# Patient Record
Sex: MALE | Race: BLACK OR AFRICAN AMERICAN | Employment: PART TIME | URBAN - METROPOLITAN AREA
[De-identification: names, ages, dates, MRNs, and addresses within clinical notes are randomized per-mention and may not be internally consistent; named-entity substitution may affect disease eponyms.]

---

## 2017-01-07 PROBLEM — J93.83 SPONTANEOUS PNEUMOTHORAX: Status: ACTIVE | Noted: 2017-01-07

## 2017-01-18 ENCOUNTER — OFFICE VISIT (OUTPATIENT)
Dept: CARDIOTHORACIC SURGERY | Age: 27
End: 2017-01-18

## 2017-01-18 VITALS
TEMPERATURE: 97.8 F | BODY MASS INDEX: 22.58 KG/M2 | HEART RATE: 66 BPM | WEIGHT: 195.2 LBS | HEIGHT: 78 IN | OXYGEN SATURATION: 98 % | SYSTOLIC BLOOD PRESSURE: 120 MMHG | DIASTOLIC BLOOD PRESSURE: 72 MMHG

## 2017-01-18 DIAGNOSIS — Z09 FOLLOW-UP EXAMINATION FOLLOWING SURGERY: Primary | ICD-10-CM

## 2017-01-18 PROCEDURE — 99024 POSTOP FOLLOW-UP VISIT: CPT | Performed by: THORACIC SURGERY (CARDIOTHORACIC VASCULAR SURGERY)

## 2020-08-11 ENCOUNTER — HOSPITAL ENCOUNTER (EMERGENCY)
Age: 30
Discharge: HOME | End: 2020-08-11
Attending: EMERGENCY MEDICINE
Payer: MEDICAID

## 2020-08-11 VITALS
HEIGHT: 78 IN | DIASTOLIC BLOOD PRESSURE: 76 MMHG | SYSTOLIC BLOOD PRESSURE: 135 MMHG | BODY MASS INDEX: 27.31 KG/M2 | RESPIRATION RATE: 16 BRPM | OXYGEN SATURATION: 99 % | TEMPERATURE: 98.3 F | WEIGHT: 236 LBS | HEART RATE: 56 BPM

## 2020-08-11 PROCEDURE — 99283 EMERGENCY DEPT VISIT LOW MDM: CPT

## 2020-08-11 PROCEDURE — 96372 THER/PROPH/DIAG INJ SC/IM: CPT

## 2020-08-11 PROCEDURE — 6360000002 HC RX W HCPCS: Performed by: STUDENT IN AN ORGANIZED HEALTH CARE EDUCATION/TRAINING PROGRAM

## 2020-08-11 RX ORDER — METHYLPREDNISOLONE 4 MG/1
TABLET ORAL
Qty: 1 KIT | Refills: 0 | Status: SHIPPED | OUTPATIENT
Start: 2020-08-11 | End: 2020-08-17

## 2020-08-11 RX ORDER — KETOROLAC TROMETHAMINE 30 MG/ML
30 INJECTION, SOLUTION INTRAMUSCULAR; INTRAVENOUS ONCE
Status: COMPLETED | OUTPATIENT
Start: 2020-08-11 | End: 2020-08-11

## 2020-08-11 RX ORDER — GABAPENTIN 100 MG/1
100 CAPSULE ORAL 3 TIMES DAILY
Qty: 21 CAPSULE | Refills: 0 | Status: SHIPPED | OUTPATIENT
Start: 2020-08-11 | End: 2020-08-21

## 2020-08-11 RX ADMIN — KETOROLAC TROMETHAMINE 30 MG: 30 INJECTION, SOLUTION INTRAMUSCULAR at 11:24

## 2020-08-11 ASSESSMENT — PAIN DESCRIPTION - PAIN TYPE: TYPE: ACUTE PAIN

## 2020-08-11 ASSESSMENT — ENCOUNTER SYMPTOMS
EYE PAIN: 0
BACK PAIN: 1
NAUSEA: 0
SORE THROAT: 0
WHEEZING: 0
SHORTNESS OF BREATH: 0
ABDOMINAL PAIN: 0
VOMITING: 0

## 2020-08-11 ASSESSMENT — PAIN DESCRIPTION - LOCATION: LOCATION: BACK;LEG

## 2020-08-11 ASSESSMENT — PAIN SCALES - GENERAL: PAINLEVEL_OUTOF10: 8

## 2020-08-11 ASSESSMENT — PAIN DESCRIPTION - ORIENTATION: ORIENTATION: LOWER;LEFT

## 2020-08-11 ASSESSMENT — PAIN DESCRIPTION - FREQUENCY: FREQUENCY: CONTINUOUS

## 2020-08-11 ASSESSMENT — PAIN DESCRIPTION - DESCRIPTORS: DESCRIPTORS: CONSTANT

## 2020-08-11 NOTE — ED PROVIDER NOTES
[facility] RESIDENT NOTE       Date of evaluation: 8/11/2020    Chief Complaint     Back Pain (Low middle back pain, worse over 2 days. Burning pain radiating down left leg ) and Leg Pain    History of Present Illness     Kyle Marks is a 26 y.o. male who presents with a chief complaint of Back Pain (Low middle back pain, worse over 2 days. Burning pain radiating down left leg ) and Leg Pain    Past medical history notable for: Spontaneous pneumothorax in 2017, previous episodes of sciatica, most recently 2 years ago    The patient presents with acute onset of low back pain radiating down his left leg starting yesterday afternoon while he was cleaning his house. He states that he has had severe pain ever since. He has not had a loss of continence of bowel or bladder. He does not have any numbness. He attempted naproxen as well as a lidocaine patch which is not helped. In the past, when he was diagnosed with sciatica, he was given steroids and a muscle relaxer which she said was helpful. Other than stated above, no additional aggravating or alleviating factors are noted. All nursing notes and triage notes were appropriately reviewed in the course of the creation of this note. Past Medical, Surgical, Family, and Social History     He has no past medical history on file. He has a past surgical history that includes Tonsillectomy; Urethral Dilation - Filiform (male); and Lung surgery (Left, 01/10/2017). His family history is not on file. He reports that he quit smoking about 3 years ago. His smoking use included cigarettes. He has a 2.50 pack-year smoking history. He does not have any smokeless tobacco history on file. He reports current alcohol use. He reports previous drug use. Frequency: 1.00 time per week. Review of Systems     Review of Systems   Constitutional: Negative for chills and fever.    HENT: Negative for ear pain and sore throat. Eyes: Negative for pain. Respiratory: Negative for shortness of breath and wheezing. Cardiovascular: Negative for chest pain. Gastrointestinal: Negative for abdominal pain, nausea and vomiting. Genitourinary: Negative for dysuria and hematuria. Musculoskeletal: Positive for back pain. Negative for neck stiffness. Central, low back, radiating down the posterior left leg   Skin: Negative for rash. Neurological: Negative for seizures and syncope. Psychiatric/Behavioral: Negative. Medications     Previous Medications    No medications on file       Allergies     He has No Known Allergies. Physical Exam     INITIAL VITALS:   BP: 139/89, Temp: 98.3 °F (36.8 °C), Pulse: 58, Resp: 18, SpO2: 99 %     Physical Exam  Constitutional:       General: He is not in acute distress. Comments: Patient is actively ambulating as I walk in the room, clearly in pain. HENT:      Head: Normocephalic and atraumatic. Right Ear: External ear normal.      Left Ear: External ear normal.      Nose: Nose normal.      Mouth/Throat:      Mouth: Mucous membranes are moist.   Eyes:      General: No scleral icterus. Extraocular Movements: Extraocular movements intact. Pupils: Pupils are equal, round, and reactive to light. Cardiovascular:      Rate and Rhythm: Regular rhythm. Heart sounds: Normal heart sounds. No murmur. No friction rub. No gallop. Pulmonary:      Effort: Pulmonary effort is normal. No respiratory distress. Breath sounds: Normal breath sounds. No wheezing or rhonchi. Abdominal:      Palpations: Abdomen is soft. Tenderness: There is no abdominal tenderness. There is no guarding or rebound. Musculoskeletal:         General: Tenderness (Mild tenderness to palpation over the mid low back and slightly off-center to the left.) present. No deformity. Skin:     General: Skin is warm and dry. Neurological:      General: No focal deficit present. Mental Status: He is alert and oriented to person, place, and time. Comments: Normal strength and sensation in the bilateral lower extremities. Gait normal.  No saddle anesthesia. Psychiatric:         Mood and Affect: Mood normal.         Behavior: Behavior normal.          Diagnostic Results     LABS AND RADIOLOGY  Please see electronic medical record for any tests performed in the ED. All results were reviewed by me during the course of the patient's emergency department stay. No orders to display     RECENT VITALS:  BP: 139/89, Temp: 98.3 °F (36.8 °C), Pulse: 58,Resp: 18, SpO2: 99 %     Procedures     none    ED Course   Nursing Notes, Past Medical Hx, Past Surgical Hx, Social Hx, Allergies, and Family Hx were reviewed. The patient was given the following medications:  Medications   ketorolac (TORADOL) injection 30 mg (has no administration in time range)       CONSULTS:  None    MEDICAL DECISION MAKING / ASSESSMENT / PLAN     Rani Peter is a 26 y.o. male with a history and presentation as described above. This patient was also evaluated by the attending physician. All care plans werediscussed and agreed upon. Upon presentation, the patient was alert, oriented, cooperative and in pacing around the room. Vital signs were unremarkable. ED Course as of Aug 11 1115   Tue Aug 11, 2020   1110 Based on this patient's initial presentation, the most likely cause is a lumbar radiculopathy versus sciatica on the left. Given that his pain does actually originate centrally I favor lumbar radiculopathy. No report or evidence of external trauma, however the patient is particularly tall and has episodes of this in the past.  This makes it more likely that he is to have some degree of a bulging lumbar disc inflaming a nerve root.     [AF]   [id number] Emergency such as cauda equina and epidural abscess were considered, however the patient does not have any risk factors, or symptoms, or physical exam findings

## 2020-08-11 NOTE — ED NOTES
Patient prepared for and ready to be discharged. Patient discharged at this time in no acute distress after verbalizing understanding of discharge instructions. Patient left after receiving After Visit Summary instructions.         Talib Castillo RN  08/11/20 8908

## 2020-08-14 ENCOUNTER — HOSPITAL ENCOUNTER (EMERGENCY)
Age: 30
Discharge: HOME | End: 2020-08-14
Attending: EMERGENCY MEDICINE
Payer: MEDICAID

## 2020-08-14 VITALS
TEMPERATURE: 98.3 F | RESPIRATION RATE: 16 BRPM | HEART RATE: 62 BPM | SYSTOLIC BLOOD PRESSURE: 135 MMHG | DIASTOLIC BLOOD PRESSURE: 93 MMHG | OXYGEN SATURATION: 98 %

## 2020-08-14 PROCEDURE — 99282 EMERGENCY DEPT VISIT SF MDM: CPT

## 2020-08-14 RX ORDER — CYCLOBENZAPRINE HCL 5 MG
5 TABLET ORAL 3 TIMES DAILY PRN
Qty: 21 TABLET | Refills: 0 | Status: SHIPPED | OUTPATIENT
Start: 2020-08-14 | End: 2020-08-21

## 2020-08-14 ASSESSMENT — PAIN DESCRIPTION - PAIN TYPE: TYPE: ACUTE PAIN

## 2020-08-14 ASSESSMENT — PAIN DESCRIPTION - FREQUENCY: FREQUENCY: CONTINUOUS

## 2020-08-14 ASSESSMENT — PAIN SCALES - GENERAL: PAINLEVEL_OUTOF10: 10

## 2020-08-14 ASSESSMENT — PAIN DESCRIPTION - LOCATION: LOCATION: BACK;LEG

## 2020-08-14 ASSESSMENT — PAIN DESCRIPTION - ORIENTATION: ORIENTATION: RIGHT

## 2020-08-14 NOTE — ED PROVIDER NOTES
[address] [facility]          ATTENDING PHYSICIAN NOTE       Date of evaluation: 8/14/2020    Chief Complaint     Back Pain and Leg Pain      History of Present Illness     Skye Barajas is a 26 y.o. male who presents with persistent left low back and leg pain. Please note, triage note describes the patient's pain is being in the right leg, but the patient clarifies that the pain is in the left leg, unchanged from his recent ED visit. Patient states that he has a history of prior episodes of left low back pain and left-sided sciatica. He states that the first episode was a number of years ago, but he has now had several exacerbations, which he feels have occurred with less strain, and have been more severe. Patient describes that he had been doing some housework approximately 4 days ago now, and sat down to rest, and upon standing up had severe pain in the left low back radiating down the posterior aspect of the left leg to the calf. He was seen in this emergency department now just shy of 3 days ago, and was given a dose of IM Toradol, and discharged on gabapentin and a Medrol Dosepak. He was not given any outpatient referrals at that time. The patient presents today stating that the medications that he received 3 days ago have not significantly improved his pain. As a result, he is having difficulty sleeping at night, and continues to be quite uncomfortable. He denies any worsening of the pain. He denies any new neurologic symptoms. Patient describes his pain as 10 out of 10 in intensity, a stabbing, aching pain that is primarily in the left low back, radiating down the posterior aspect of the left leg to the lower calf. He feels that it is worse when he gets up and moves around after having been seated or lying down for any length of time. It does feel somewhat better when he is up pacing. He denies any lower extremity numbness or weakness.   He denies any difficulty with bladder or bowel movements. He denies any fevers or chills. He denies any IV drug use. The patient states that he is not specifically here for pain medication, but is hoping that he can get an x-ray. He denies any trauma prior to the onset of his symptoms on Monday, or since then. On review of systems, the patient notes that he has been told by his fiancée that he likely has sleep apnea, as he snores significantly and sometimes sounds as though he stops breathing during the night. He has not had this evaluated as an outpatient as he does not have a primary care provider. Review of Systems     Review of Systems   All other systems reviewed and are negative. Past Medical, Surgical, Family, and Social History     He has no past medical history on file. He has a past surgical history that includes Tonsillectomy; Urethral Dilation - Filiform (male); and Lung surgery (Left, 01/10/2017). His family history is not on file. He reports that he quit smoking about 3 years ago. His smoking use included cigarettes. He has a 2.50 pack-year smoking history. He does not have any smokeless tobacco history on file. He reports current alcohol use. He reports previous drug use. Frequency: 1.00 time per week. Medications     Discharge Medication List as of 8/14/2020  6:43 AM      CONTINUE these medications which have NOT CHANGED    Details   gabapentin (NEURONTIN) 100 MG capsule Take 1 capsule by mouth 3 times daily for 7 days. Intended supply: 90 days, Disp-21 capsule,R-0Print      methylPREDNISolone (MEDROL, ALVIN,) 4 MG tablet Take by mouth., Disp-1 kit,R-0Print             Allergies     He has No Known Allergies. Physical Exam     INITIAL VITALS: BP: (!) 135/93, Temp: 98.3 °F (36.8 °C), Pulse: 62, Resp: 16, SpO2: 98 %     General: Well appearing. Pacing in the emergency department room, uncomfortable, but in NAD. HEENT: Pupils are equal, round, and reactive to light.  Extraocular muscles are intact. Conjunctivae are clear and moist. No redness or drainage from the eyes. No drainage from the nose. The oropharynx appeared to be normal.    Neck: Supple, with full range of motion. Back:  No focal midline T or L spine tenderness to palpation, crepitus, or step-offs. There is mild discomfort to palpation in the very low left lumbar region, into the gluteal region. Patient has normal strength and sensation in the bilateral lower extremities. Cardiovascular:  2+ radial pulses bilaterally. 2+ DP pulses bilaterally. Respiratory: Unlabored breathing with equal chest rise and fall. Skin: Warm and dry, without rashes or ecchymoses, lacerations or abrasions. Neuro: Alert and oriented x3. No focal neurologic deficits are noted. Extremities: Warm and well-perfused, without clubbing, cyanosis, or edema. The patient moves all extremities equally. Psych: The patient's mood and affect are generally within normal limits for their presentation. Diagnostic Results       RADIOLOGY:  No orders to display       LABS:   No results found for this visit on 08/14/20. RECENT VITALS:  BP: (!) 135/93, Temp: 98.3 °F (36.8 °C), Pulse: 62, Resp: 16, SpO2: 98 %     Procedures       ED Course     Nursing Notes, Past Medical Hx, Past Surgical Hx, Social Hx, Allergies, and Family Hx were reviewed.     The patient was given the following medications:  Orders Placed This Encounter   Medications    cyclobenzaprine (FLEXERIL) 5 MG tablet     Sig: Take 1 tablet by mouth 3 times daily as needed for Muscle spasms     Dispense:  21 tablet     Refill:  0       CONSULTS:  None    MEDICAL DECISION MAKING / ASSESSMENT / Calleen Verito is a 26 y.o. male with a prior history of episodes of left-sided sciatica, who presents today with a another episode of similar symptoms which has been present for 4 days now, not improved with the Medrol Dosepak and gabapentin which were prescribed at an ED visit for these symptoms 3 days ago. He has no red flags in his history or physical examination. No neurologic deficits. No infectious symptoms. Patient initially presents asking for an x-ray of the back. I discussed with the patient why this would not be a particularly useful test, given the lack of trauma and the limited information that would be obtained. The patient expressed his understanding. We spent significant time in discussion and counseling regarding management of sciatica. The patient drove himself to the emergency department, so declined any sedating medications. He was offered Toradol and a lidocaine patch, but states that neither of these have given him any relief in the past.  He does note that with a prior similar episode of sciatica, a prescription for a muscle relaxer, specifically Flexeril, was helpful for him. He is given a prescription for this in the emergency department to fill as an outpatient. He was given counseling on conservative self-care measures, and also the importance of outpatient follow-up. He was referred to the [facility], [facility] medical clinic, and also to the [place]. The patient also mentioned in review of systems that there is concern that he may have sleep apnea. For this reason also, he was referred for primary care follow-up, for further evaluation, and possibly sleep study. Clinical Impression     1. Acute left-sided low back pain with left-sided sciatica    2.  Sleep apnea-like behavior        Disposition     PATIENT REFERRED TO:  [address] [place] [address], [address]  Call today  to discuss your ER visit, and arrange a follow-up appointment    [place] [id number]  [facility] [address] [facility]  [phone]    Call today  to discuss your ER visit, and arrange a follow-up appointment      DISCHARGE MEDICATIONS:  Discharge Medication List as of 8/14/2020  6:43 AM      START taking these medications    Details   cyclobenzaprine (FLEXERIL) 5 MG tablet Take 1 tablet by mouth 3 times daily as needed for Muscle spasms, Disp-21 tablet,R-0Print             DISPOSITION Decision To Discharge    (Please note that portions of this note were completed with voice recognition software.   Efforts were made to edit the dictations but occasionally words are mis-transcribed.)     David Escamilla MD  08/14/20 8109

## 2020-08-21 ENCOUNTER — OFFICE VISIT (OUTPATIENT)
Dept: INTERNAL MEDICINE CLINIC | Age: 30
End: 2020-08-21
Payer: MEDICAID

## 2020-08-21 VITALS
OXYGEN SATURATION: 98 % | BODY MASS INDEX: 28.81 KG/M2 | SYSTOLIC BLOOD PRESSURE: 132 MMHG | TEMPERATURE: 97.1 F | HEIGHT: 78 IN | DIASTOLIC BLOOD PRESSURE: 76 MMHG | WEIGHT: 249 LBS | HEART RATE: 78 BPM

## 2020-08-21 DIAGNOSIS — Z00.00 ROUTINE GENERAL MEDICAL EXAMINATION AT A HEALTH CARE FACILITY: ICD-10-CM

## 2020-08-21 PROBLEM — M54.32 LEFT SIDED SCIATICA: Status: ACTIVE | Noted: 2020-08-21

## 2020-08-21 PROBLEM — J93.83 SPONTANEOUS PNEUMOTHORAX: Status: RESOLVED | Noted: 2017-01-07 | Resolved: 2020-08-21

## 2020-08-21 LAB
A/G RATIO: 1.8 (ref 1.1–2.2)
ALBUMIN SERPL-MCNC: 4.4 G/DL (ref 3.4–5)
ALP BLD-CCNC: 73 U/L (ref 40–129)
ALT SERPL-CCNC: 47 U/L (ref 10–40)
ANION GAP SERPL CALCULATED.3IONS-SCNC: 12 MMOL/L (ref 3–16)
AST SERPL-CCNC: 40 U/L (ref 15–37)
BILIRUB SERPL-MCNC: 0.4 MG/DL (ref 0–1)
BUN BLDV-MCNC: 20 MG/DL (ref 7–20)
CALCIUM SERPL-MCNC: 9.5 MG/DL (ref 8.3–10.6)
CHLORIDE BLD-SCNC: 98 MMOL/L (ref 99–110)
CHOLESTEROL, FASTING: 217 MG/DL (ref 0–199)
CO2: 27 MMOL/L (ref 21–32)
CREAT SERPL-MCNC: 0.7 MG/DL (ref 0.9–1.3)
GFR AFRICAN AMERICAN: >60
GFR NON-AFRICAN AMERICAN: >60
GLOBULIN: 2.4 G/DL
GLUCOSE BLD-MCNC: 87 MG/DL (ref 70–99)
HCT VFR BLD CALC: 46.8 % (ref 40.5–52.5)
HDLC SERPL-MCNC: 88 MG/DL (ref 40–60)
HEMOGLOBIN: 15.1 G/DL (ref 13.5–17.5)
LDL CHOLESTEROL CALCULATED: 108 MG/DL
MCH RBC QN AUTO: 28.3 PG (ref 26–34)
MCHC RBC AUTO-ENTMCNC: 32.2 G/DL (ref 31–36)
MCV RBC AUTO: 87.9 FL (ref 80–100)
PDW BLD-RTO: 13.7 % (ref 12.4–15.4)
PLATELET # BLD: 317 K/UL (ref 135–450)
PMV BLD AUTO: 7.8 FL (ref 5–10.5)
POTASSIUM SERPL-SCNC: 4.7 MMOL/L (ref 3.5–5.1)
RBC # BLD: 5.32 M/UL (ref 4.2–5.9)
SODIUM BLD-SCNC: 137 MMOL/L (ref 136–145)
TOTAL PROTEIN: 6.8 G/DL (ref 6.4–8.2)
TRIGLYCERIDE, FASTING: 105 MG/DL (ref 0–150)
TSH REFLEX: 2.85 UIU/ML (ref 0.27–4.2)
VLDLC SERPL CALC-MCNC: 21 MG/DL
WBC # BLD: 7.4 K/UL (ref 4–11)

## 2020-08-21 PROCEDURE — 99204 OFFICE O/P NEW MOD 45 MIN: CPT | Performed by: NURSE PRACTITIONER

## 2020-08-21 RX ORDER — METHOCARBAMOL 500 MG/1
500 TABLET, FILM COATED ORAL 4 TIMES DAILY
Qty: 40 TABLET | Refills: 0 | Status: SHIPPED | OUTPATIENT
Start: 2020-08-21 | End: 2020-08-31

## 2020-08-21 RX ORDER — PREDNISONE 20 MG/1
TABLET ORAL
Qty: 15 TABLET | Refills: 0 | Status: SHIPPED | OUTPATIENT
Start: 2020-08-21

## 2020-08-21 RX ORDER — METHYLPREDNISOLONE 4 MG/1
TABLET ORAL
COMMUNITY
Start: 2019-08-24 | End: 2020-08-21

## 2020-08-21 ASSESSMENT — ENCOUNTER SYMPTOMS
COUGH: 0
SHORTNESS OF BREATH: 0
COLOR CHANGE: 0
CONSTIPATION: 0
BACK PAIN: 1
ABDOMINAL PAIN: 0
WHEEZING: 0
DIARRHEA: 0
SINUS PRESSURE: 0
SINUS PAIN: 0

## 2020-08-21 ASSESSMENT — PATIENT HEALTH QUESTIONNAIRE - PHQ9
SUM OF ALL RESPONSES TO PHQ QUESTIONS 1-9: 0
2. FEELING DOWN, DEPRESSED OR HOPELESS: 0
SUM OF ALL RESPONSES TO PHQ QUESTIONS 1-9: 0
1. LITTLE INTEREST OR PLEASURE IN DOING THINGS: 0
DEPRESSION UNABLE TO ASSESS: FUNCTIONAL CAPACITY MOTIVATION LIMITS ACCURACY
SUM OF ALL RESPONSES TO PHQ9 QUESTIONS 1 & 2: 0

## 2020-08-21 NOTE — ASSESSMENT & PLAN NOTE
Prednisone taper x 10 days   Robaxin prn   Heat TID   Provided with stretches and exercises    Follow up if not improved in 4-7 days   Consider xray and PT

## 2020-08-21 NOTE — PROGRESS NOTES
Subjective:      Patient ID: Peter Montoya is a 26 y.o. y.o. male. HPI    Patient is here for a new patient visit  He is generally healthy without chronic condition. Does not regularly take medications. He reports low back pain that started on 8/10/20. Began after cleaning his house. Denies specific injury. Pain is on the left side and radiates down the left posterior thigh beyond the knee. Denies weakness. No bowel or bladder incontinence. Pain is 7/10. Pain was worse in the first weak. Reports limited ROM. Has improved minimally. He went to the ER twice. Was given medrol dose pack and gabapentin which did not help much. He went back a few days later and was given flexeril which helped slightly. Pain is worse at night when trying to sleep. He has also tried tylenol and ibuprofen which have not helped. He has tried cat and cow exercises which has not helped.      Chief Complaint   Patient presents with   Wilbert Ospina Doctor    Back Pain     left buttock pain shoots down into the leg been going on for 11 days/steroid pack recently did not help       Past Medical History:   Diagnosis Date    Bradycardia        Past Surgical History:   Procedure Laterality Date    LUNG SURGERY Left 01/10/2017    VATS with L apical stapling    TONSILLECTOMY      URETHRAL DILATION - FILIFORM (MALE)         Social History     Socioeconomic History    Marital status: Single     Spouse name: Not on file    Number of children: Not on file    Years of education: Not on file    Highest education level: Not on file   Occupational History    Not on file   Social Needs    Financial resource strain: Not on file    Food insecurity     Worry: Not on file     Inability: Not on file    Transportation needs     Medical: Not on file     Non-medical: Not on file   Tobacco Use    Smoking status: Former Smoker     Packs/day: 0.25     Years: 10.00     Pack years: 2.50     Types: Cigarettes     Last attempt to quit: 1/5/2017 Years since quitting: 3.6    Smokeless tobacco: Never Used   Substance and Sexual Activity    Alcohol use: Yes     Comment: occasionally    Drug use: Not Currently     Frequency: 1.0 times per week     Comment: cbd    Sexual activity: Yes     Partners: Female   Lifestyle    Physical activity     Days per week: Not on file     Minutes per session: Not on file    Stress: Not on file   Relationships    Social connections     Talks on phone: Not on file     Gets together: Not on file     Attends Faith service: Not on file     Active member of club or organization: Not on file     Attends meetings of clubs or organizations: Not on file     Relationship status: Not on file    Intimate partner violence     Fear of current or ex partner: Not on file     Emotionally abused: Not on file     Physically abused: Not on file     Forced sexual activity: Not on file   Other Topics Concern    Not on file   Social History Narrative    Not on file       History reviewed. No pertinent family history. Vitals:    08/21/20 1018   BP: 132/76   Pulse: 78   Temp: 97.1 °F (36.2 °C)   SpO2: 98%       Wt Readings from Last 3 Encounters:   08/21/20 249 lb (112.9 kg)   08/11/20 236 lb (107 kg)   01/18/17 195 lb 3.2 oz (88.5 kg)       Review of Systems   Constitutional: Negative for chills, fatigue and fever. HENT: Negative for congestion, sinus pressure and sinus pain. Respiratory: Negative for cough, shortness of breath and wheezing. Cardiovascular: Negative for chest pain and palpitations. Gastrointestinal: Negative for abdominal pain, constipation and diarrhea. Musculoskeletal: Positive for back pain. Negative for arthralgias and myalgias. Skin: Negative for color change, pallor and rash. Neurological: Negative for dizziness, syncope, weakness, light-headedness and headaches. Psychiatric/Behavioral: Negative for behavioral problems, confusion and sleep disturbance. The patient is not nervous/anxious. Objective:   Physical Exam  Constitutional:       Appearance: He is well-developed. HENT:      Head: Normocephalic and atraumatic. Eyes:      Conjunctiva/sclera: Conjunctivae normal.      Pupils: Pupils are equal, round, and reactive to light. Neck:      Musculoskeletal: Normal range of motion and neck supple. Thyroid: No thyromegaly. Vascular: No JVD. Cardiovascular:      Rate and Rhythm: Normal rate and regular rhythm. Heart sounds: Normal heart sounds. Pulmonary:      Effort: Pulmonary effort is normal. No respiratory distress. Breath sounds: Normal breath sounds. No wheezing. Musculoskeletal:         General: No deformity. Lumbar back: He exhibits decreased range of motion and pain. He exhibits no tenderness. Comments: Pain with straight leg raise left side    Skin:     General: Skin is warm and dry. Capillary Refill: Capillary refill takes less than 2 seconds. Neurological:      Mental Status: He is alert and oriented to person, place, and time. Psychiatric:         Mood and Affect: Mood normal.         Behavior: Behavior normal.         Assessment:      See Problem List assessment and plan       Plan:      Routine general medical examination at a health care facility  Discussed HM   Check labs   Tdap given   Encouraged diet and exercise     Left sided sciatica  Prednisone taper x 10 days   Robaxin prn   Heat TID   Provided with stretches and exercises    Follow up if not improved in 4-7 days   Consider xray and PT         Patient engaged in shared decision making. Information given to evaluate options of treatment, understand what is needed and discussimportance of following plan.

## 2020-08-21 NOTE — PATIENT INSTRUCTIONS
Patient Education        Low Back Pain: Exercises  Introduction  Here are some examples of exercises for you to try. The exercises may be suggested for a condition or for rehabilitation. Start each exercise slowly. Ease off the exercises if you start to have pain. You will be told when to start these exercises and which ones will work best for you. How to do the exercises  Press-up   1. Lie on your stomach, supporting your body with your forearms. 2. Press your elbows down into the floor to raise your upper back. As you do this, relax your stomach muscles and allow your back to arch without using your back muscles. As your press up, do not let your hips or pelvis come off the floor. 3. Hold for 15 to 30 seconds, then relax. 4. Repeat 2 to 4 times. Alternate arm and leg (bird dog) exercise   Do this exercise slowly. Try to keep your body straight at all times, and do not let one hip drop lower than the other. 1. Start on the floor, on your hands and knees. 2. Tighten your belly muscles. 3. Raise one leg off the floor, and hold it straight out behind you. Be careful not to let your hip drop down, because that will twist your trunk. 4. Hold for about 6 seconds, then lower your leg and switch to the other leg. 5. Repeat 8 to 12 times on each leg. 6. Over time, work up to holding for 10 to 30 seconds each time. 7. If you feel stable and secure with your leg raised, try raising the opposite arm straight out in front of you at the same time. Knee-to-chest exercise   1. Lie on your back with your knees bent and your feet flat on the floor. 2. Bring one knee to your chest, keeping the other foot flat on the floor (or keeping the other leg straight, whichever feels better on your lower back). 3. Keep your lower back pressed to the floor. Hold for at least 15 to 30 seconds. 4. Relax, and lower the knee to the starting position. 5. Repeat with the other leg. Repeat 2 to 4 times with each leg.   6. To get more stretch, put your other leg flat on the floor while pulling your knee to your chest.    Curl-ups   1. Lie on the floor on your back with your knees bent at a 90-degree angle. Your feet should be flat on the floor, about 12 inches from your buttocks. 2. Cross your arms over your chest. If this bothers your neck, try putting your hands behind your neck (not your head), with your elbows spread apart. 3. Slowly tighten your belly muscles and raise your shoulder blades off the floor. 4. Keep your head in line with your body, and do not press your chin to your chest.  5. Hold this position for 1 or 2 seconds, then slowly lower yourself back down to the floor. 6. Repeat 8 to 12 times. Pelvic tilt exercise   1. Lie on your back with your knees bent. 2. \"Brace\" your stomach. This means to tighten your muscles by pulling in and imagining your belly button moving toward your spine. You should feel like your back is pressing to the floor and your hips and pelvis are rocking back. 3. Hold for about 6 seconds while you breathe smoothly. 4. Repeat 8 to 12 times. Heel dig bridging   1. Lie on your back with both knees bent and your ankles bent so that only your heels are digging into the floor. Your knees should be bent about 90 degrees. 2. Then push your heels into the floor, squeeze your buttocks, and lift your hips off the floor until your shoulders, hips, and knees are all in a straight line. 3. Hold for about 6 seconds as you continue to breathe normally, and then slowly lower your hips back down to the floor and rest for up to 10 seconds. 4. Do 8 to 12 repetitions. Hamstring stretch in doorway   1. Lie on your back in a doorway, with one leg through the open door. 2. Slide your leg up the wall to straighten your knee. You should feel a gentle stretch down the back of your leg. 3. Hold the stretch for at least 15 to 30 seconds. Do not arch your back, point your toes, or bend either knee.  Keep one heel

## 2020-08-22 LAB
HIV AG/AB: NORMAL
HIV ANTIGEN: NORMAL
HIV-1 ANTIBODY: NORMAL
HIV-2 AB: NORMAL

## 2020-09-20 PROBLEM — Z00.00 ROUTINE GENERAL MEDICAL EXAMINATION AT A HEALTH CARE FACILITY: Status: RESOLVED | Noted: 2020-08-21 | Resolved: 2020-09-20
